# Patient Record
Sex: FEMALE | Race: OTHER
[De-identification: names, ages, dates, MRNs, and addresses within clinical notes are randomized per-mention and may not be internally consistent; named-entity substitution may affect disease eponyms.]

---

## 2020-11-09 ENCOUNTER — HOSPITAL ENCOUNTER (INPATIENT)
Dept: HOSPITAL 54 - MED | Age: 50
LOS: 1 days | Discharge: HOME | DRG: 346 | End: 2020-11-10
Attending: INTERNAL MEDICINE | Admitting: INTERNAL MEDICINE
Payer: COMMERCIAL

## 2020-11-09 DIAGNOSIS — I10: ICD-10-CM

## 2020-11-09 DIAGNOSIS — Z90.49: ICD-10-CM

## 2020-11-09 DIAGNOSIS — J45.909: ICD-10-CM

## 2020-11-09 DIAGNOSIS — E06.3: ICD-10-CM

## 2020-11-09 DIAGNOSIS — E66.01: ICD-10-CM

## 2020-11-09 DIAGNOSIS — M32.9: Primary | ICD-10-CM

## 2020-11-09 DIAGNOSIS — H93.19: ICD-10-CM

## 2020-11-09 DIAGNOSIS — E78.5: ICD-10-CM

## 2020-11-09 PROCEDURE — G0378 HOSPITAL OBSERVATION PER HR: HCPCS

## 2020-11-10 VITALS — SYSTOLIC BLOOD PRESSURE: 138 MMHG | DIASTOLIC BLOOD PRESSURE: 82 MMHG

## 2020-11-10 VITALS — DIASTOLIC BLOOD PRESSURE: 99 MMHG | SYSTOLIC BLOOD PRESSURE: 168 MMHG

## 2020-11-10 LAB
ALBUMIN SERPL BCP-MCNC: 3.6 G/DL (ref 3.4–5)
ALP SERPL-CCNC: 121 U/L (ref 46–116)
ALT SERPL W P-5'-P-CCNC: 119 U/L (ref 12–78)
AST SERPL W P-5'-P-CCNC: 62 U/L (ref 15–37)
BASOPHILS # BLD AUTO: 0.2 /CMM (ref 0–0.2)
BASOPHILS NFR BLD AUTO: 1.3 % (ref 0–2)
BILIRUB SERPL-MCNC: 0.3 MG/DL (ref 0.2–1)
BUN SERPL-MCNC: 17 MG/DL (ref 7–18)
CALCIUM SERPL-MCNC: 8.6 MG/DL (ref 8.5–10.1)
CHLORIDE SERPL-SCNC: 101 MMOL/L (ref 98–107)
CO2 SERPL-SCNC: 26 MMOL/L (ref 21–32)
CREAT SERPL-MCNC: 1 MG/DL (ref 0.6–1.3)
EOSINOPHIL NFR BLD AUTO: 1.8 % (ref 0–6)
GLUCOSE SERPL-MCNC: 96 MG/DL (ref 74–106)
HCT VFR BLD AUTO: 40 % (ref 33–45)
HGB BLD-MCNC: 13.1 G/DL (ref 11.5–14.8)
LYMPHOCYTES NFR BLD AUTO: 42.8 % (ref 20–44)
LYMPHOCYTES NFR BLD AUTO: 5.7 /CMM (ref 0.8–4.8)
MAGNESIUM SERPL-MCNC: 2.3 MG/DL (ref 1.8–2.4)
MCHC RBC AUTO-ENTMCNC: 33 G/DL (ref 31–36)
MCV RBC AUTO: 98 FL (ref 82–100)
MONOCYTES NFR BLD AUTO: 0.8 /CMM (ref 0.1–1.3)
MONOCYTES NFR BLD AUTO: 6.3 % (ref 2–12)
NEUTROPHILS # BLD AUTO: 6.3 /CMM (ref 1.8–8.9)
NEUTROPHILS NFR BLD AUTO: 47.8 % (ref 43–81)
PHOSPHATE SERPL-MCNC: 5 MG/DL (ref 2.5–4.9)
PLATELET # BLD AUTO: 339 /CMM (ref 150–450)
POTASSIUM SERPL-SCNC: 3.9 MMOL/L (ref 3.5–5.1)
PROT SERPL-MCNC: 7.1 G/DL (ref 6.4–8.2)
RBC # BLD AUTO: 4.11 MIL/UL (ref 4–5.2)
SODIUM SERPL-SCNC: 138 MMOL/L (ref 136–145)
WBC NRBC COR # BLD AUTO: 13.2 K/UL (ref 4.3–11)

## 2020-11-10 NOTE — NUR
Patient is a 51 y/o female, admitted to [facility] transferred from [facility] d/t generalized body 
pain and lupus flare up. Transferred by ambulance by gurney. Patient is alert, and oriented 
x 4. Able to make needs known. Patient is ambulatory. Skin is intact with edema and dry skin 
noted on BLE. NP made aware of patient's admission. Home meds reconciled, NP informed. 
Patient denies of any pain. No s/s of acute distress or discomfort. Assisted with ADLs and 
transfers. Kept skin clean and dry. Fall precautions observed. Patient oriented to hospital 
and staff. Call light within reach.

## 2020-11-10 NOTE — NUR
ms rn

received on bed, awake,alert,oriented x4,not in any form of distress, respirations even and 
unlabored,no sob noted, lungs are clear,abdomen soft,positive bowel sounds,denies pain at 
this time,will monitor patient.

## 2020-11-10 NOTE — NUR
ms rn

patient was  by , discharge instructions given, prescription of prednisone 
given,all needs attended.